# Patient Record
Sex: FEMALE | Race: OTHER | HISPANIC OR LATINO | URBAN - METROPOLITAN AREA
[De-identification: names, ages, dates, MRNs, and addresses within clinical notes are randomized per-mention and may not be internally consistent; named-entity substitution may affect disease eponyms.]

---

## 2017-07-01 ENCOUNTER — OUTPATIENT (OUTPATIENT)
Dept: OUTPATIENT SERVICES | Facility: HOSPITAL | Age: 47
LOS: 1 days | Discharge: HOME | End: 2017-07-01

## 2017-07-01 DIAGNOSIS — Z12.31 ENCOUNTER FOR SCREENING MAMMOGRAM FOR MALIGNANT NEOPLASM OF BREAST: ICD-10-CM

## 2017-10-25 ENCOUNTER — OUTPATIENT (OUTPATIENT)
Dept: OUTPATIENT SERVICES | Facility: HOSPITAL | Age: 47
LOS: 1 days | Discharge: HOME | End: 2017-10-25

## 2017-10-25 DIAGNOSIS — E03.9 HYPOTHYROIDISM, UNSPECIFIED: ICD-10-CM

## 2017-10-25 DIAGNOSIS — E79.9 DISORDER OF PURINE AND PYRIMIDINE METABOLISM, UNSPECIFIED: ICD-10-CM

## 2018-02-06 ENCOUNTER — OUTPATIENT (OUTPATIENT)
Dept: OUTPATIENT SERVICES | Facility: HOSPITAL | Age: 48
LOS: 1 days | Discharge: HOME | End: 2018-02-06

## 2018-02-06 DIAGNOSIS — E03.9 HYPOTHYROIDISM, UNSPECIFIED: ICD-10-CM

## 2018-02-06 DIAGNOSIS — E11.9 TYPE 2 DIABETES MELLITUS WITHOUT COMPLICATIONS: ICD-10-CM

## 2018-05-16 ENCOUNTER — OUTPATIENT (OUTPATIENT)
Dept: OUTPATIENT SERVICES | Facility: HOSPITAL | Age: 48
LOS: 1 days | Discharge: HOME | End: 2018-05-16

## 2018-05-16 DIAGNOSIS — E11.9 TYPE 2 DIABETES MELLITUS WITHOUT COMPLICATIONS: ICD-10-CM

## 2018-05-16 DIAGNOSIS — E88.9 METABOLIC DISORDER, UNSPECIFIED: ICD-10-CM

## 2018-05-16 DIAGNOSIS — E03.9 HYPOTHYROIDISM, UNSPECIFIED: ICD-10-CM

## 2018-06-28 ENCOUNTER — APPOINTMENT (OUTPATIENT)
Dept: OBGYN | Facility: CLINIC | Age: 48
End: 2018-06-28
Payer: COMMERCIAL

## 2018-06-28 PROBLEM — Z00.00 ENCOUNTER FOR PREVENTIVE HEALTH EXAMINATION: Status: ACTIVE | Noted: 2018-06-28

## 2018-06-28 PROCEDURE — 99396 PREV VISIT EST AGE 40-64: CPT

## 2018-08-04 ENCOUNTER — OUTPATIENT (OUTPATIENT)
Dept: OUTPATIENT SERVICES | Facility: HOSPITAL | Age: 48
LOS: 1 days | Discharge: HOME | End: 2018-08-04

## 2018-08-04 DIAGNOSIS — Z12.31 ENCOUNTER FOR SCREENING MAMMOGRAM FOR MALIGNANT NEOPLASM OF BREAST: ICD-10-CM

## 2018-10-11 ENCOUNTER — OUTPATIENT (OUTPATIENT)
Dept: OUTPATIENT SERVICES | Facility: HOSPITAL | Age: 48
LOS: 1 days | Discharge: HOME | End: 2018-10-11

## 2018-10-11 DIAGNOSIS — E03.9 HYPOTHYROIDISM, UNSPECIFIED: ICD-10-CM

## 2018-10-11 DIAGNOSIS — E78.00 PURE HYPERCHOLESTEROLEMIA, UNSPECIFIED: ICD-10-CM

## 2019-01-17 ENCOUNTER — APPOINTMENT (OUTPATIENT)
Dept: OBGYN | Facility: CLINIC | Age: 49
End: 2019-01-17
Payer: COMMERCIAL

## 2019-01-17 PROCEDURE — 87490 CHLMYD TRACH DNA DIR PROBE: CPT

## 2019-01-17 PROCEDURE — 99214 OFFICE O/P EST MOD 30 MIN: CPT

## 2019-08-17 ENCOUNTER — OUTPATIENT (OUTPATIENT)
Dept: OUTPATIENT SERVICES | Facility: HOSPITAL | Age: 49
LOS: 1 days | Discharge: HOME | End: 2019-08-17
Payer: COMMERCIAL

## 2019-08-17 DIAGNOSIS — Z12.31 ENCOUNTER FOR SCREENING MAMMOGRAM FOR MALIGNANT NEOPLASM OF BREAST: ICD-10-CM

## 2019-08-17 PROCEDURE — 77063 BREAST TOMOSYNTHESIS BI: CPT | Mod: 26

## 2019-08-17 PROCEDURE — 77067 SCR MAMMO BI INCL CAD: CPT | Mod: 26

## 2020-01-30 ENCOUNTER — APPOINTMENT (OUTPATIENT)
Dept: OBGYN | Facility: CLINIC | Age: 50
End: 2020-01-30
Payer: COMMERCIAL

## 2020-01-30 PROCEDURE — 99396 PREV VISIT EST AGE 40-64: CPT

## 2020-10-03 ENCOUNTER — RESULT REVIEW (OUTPATIENT)
Age: 50
End: 2020-10-03

## 2020-10-03 ENCOUNTER — OUTPATIENT (OUTPATIENT)
Dept: OUTPATIENT SERVICES | Facility: HOSPITAL | Age: 50
LOS: 1 days | Discharge: HOME | End: 2020-10-03
Payer: COMMERCIAL

## 2020-10-03 DIAGNOSIS — Z12.31 ENCOUNTER FOR SCREENING MAMMOGRAM FOR MALIGNANT NEOPLASM OF BREAST: ICD-10-CM

## 2020-10-03 PROCEDURE — 77063 BREAST TOMOSYNTHESIS BI: CPT | Mod: 26

## 2020-10-03 PROCEDURE — 77067 SCR MAMMO BI INCL CAD: CPT | Mod: 26

## 2021-05-06 ENCOUNTER — APPOINTMENT (OUTPATIENT)
Dept: HEMATOLOGY ONCOLOGY | Facility: CLINIC | Age: 51
End: 2021-05-06
Payer: COMMERCIAL

## 2021-05-06 ENCOUNTER — TRANSCRIPTION ENCOUNTER (OUTPATIENT)
Age: 51
End: 2021-05-06

## 2021-05-06 VITALS
SYSTOLIC BLOOD PRESSURE: 117 MMHG | TEMPERATURE: 98 F | HEART RATE: 96 BPM | RESPIRATION RATE: 20 BRPM | DIASTOLIC BLOOD PRESSURE: 73 MMHG | HEIGHT: 63 IN | WEIGHT: 180 LBS | BODY MASS INDEX: 31.89 KG/M2

## 2021-05-06 DIAGNOSIS — E78.00 PURE HYPERCHOLESTEROLEMIA, UNSPECIFIED: ICD-10-CM

## 2021-05-06 DIAGNOSIS — F17.200 NICOTINE DEPENDENCE, UNSPECIFIED, UNCOMPLICATED: ICD-10-CM

## 2021-05-06 DIAGNOSIS — I51.9 HEART DISEASE, UNSPECIFIED: ICD-10-CM

## 2021-05-06 DIAGNOSIS — E11.9 TYPE 2 DIABETES MELLITUS W/OUT COMPLICATIONS: ICD-10-CM

## 2021-05-06 DIAGNOSIS — E03.9 HYPOTHYROIDISM, UNSPECIFIED: ICD-10-CM

## 2021-05-06 LAB
BASOPHILS # BLD AUTO: 0.06 K/UL — SIGNIFICANT CHANGE UP (ref 0–0.2)
BASOPHILS NFR BLD AUTO: 0.5 % — SIGNIFICANT CHANGE UP (ref 0–1)
EOSINOPHIL # BLD AUTO: 0.31 K/UL — SIGNIFICANT CHANGE UP (ref 0–0.7)
EOSINOPHIL NFR BLD AUTO: 2.6 % — SIGNIFICANT CHANGE UP (ref 0–8)
HCT VFR BLD CALC: 38.1 % — SIGNIFICANT CHANGE UP (ref 37–47)
HGB BLD-MCNC: 13.1 G/DL — SIGNIFICANT CHANGE UP (ref 12–16)
IMM GRANULOCYTES NFR BLD AUTO: 0.7 % — HIGH (ref 0.1–0.3)
LYMPHOCYTES # BLD AUTO: 38.2 % — SIGNIFICANT CHANGE UP (ref 20.5–51.1)
LYMPHOCYTES # BLD AUTO: 4.53 K/UL — HIGH (ref 1.2–3.4)
MCHC RBC-ENTMCNC: 32 PG — HIGH (ref 27–31)
MCHC RBC-ENTMCNC: 34.4 G/DL — SIGNIFICANT CHANGE UP (ref 32–37)
MCV RBC AUTO: 92.9 FL — SIGNIFICANT CHANGE UP (ref 81–99)
MONOCYTES # BLD AUTO: 0.67 K/UL — HIGH (ref 0.1–0.6)
MONOCYTES NFR BLD AUTO: 5.7 % — SIGNIFICANT CHANGE UP (ref 1.7–9.3)
NEUTROPHILS # BLD AUTO: 6.2 K/UL — SIGNIFICANT CHANGE UP (ref 1.4–6.5)
NEUTROPHILS NFR BLD AUTO: 52.3 % — SIGNIFICANT CHANGE UP (ref 42.2–75.2)
NRBC # BLD: 0 /100 WBCS — SIGNIFICANT CHANGE UP (ref 0–0)
PLATELET # BLD AUTO: 323 K/UL — SIGNIFICANT CHANGE UP (ref 130–400)
RBC # BLD: 4.1 M/UL — LOW (ref 4.2–5.4)
RBC # FLD: 11.8 % — SIGNIFICANT CHANGE UP (ref 11.5–14.5)
WBC # BLD: 11.85 K/UL — HIGH (ref 4.8–10.8)
WBC # FLD AUTO: 11.85 K/UL — HIGH (ref 4.8–10.8)

## 2021-05-06 PROCEDURE — 88189 FLOWCYTOMETRY/READ 16 & >: CPT

## 2021-05-06 PROCEDURE — 99204 OFFICE O/P NEW MOD 45 MIN: CPT

## 2021-05-07 LAB
ALBUMIN SERPL ELPH-MCNC: 4.4 G/DL
ALP BLD-CCNC: 115 U/L
ALT SERPL-CCNC: 22 U/L
ANION GAP SERPL CALC-SCNC: 14 MMOL/L
AST SERPL-CCNC: 20 U/L
BILIRUB SERPL-MCNC: <0.2 MG/DL
BUN SERPL-MCNC: 16 MG/DL
CALCIUM SERPL-MCNC: 9.7 MG/DL
CHLORIDE SERPL-SCNC: 103 MMOL/L
CO2 SERPL-SCNC: 23 MMOL/L
CREAT SERPL-MCNC: 0.6 MG/DL
DIRECT COOMBS: NORMAL
GLUCOSE SERPL-MCNC: 147 MG/DL
IGG SER QL IEP: 1196 MG/DL
LDH SERPL-CCNC: 217
POTASSIUM SERPL-SCNC: 4.3 MMOL/L
PROT SERPL-MCNC: 6.9 G/DL
SODIUM SERPL-SCNC: 140 MMOL/L

## 2021-05-08 NOTE — HISTORY OF PRESENT ILLNESS
[de-identified] : 51 year old female referred to us by Dr Calderon for elevated WBC. She has PMH including diabetes, hypertension, and hypothyroidism. She is a currently every day smoker. 1PPD for the past 30 years. She denies any fever, weight loss,or new pain areas. She states that she does have night sweats since after being diagnosed with her hypothyroidism.  Reviewed CBC from March 2021 which showed CBC of 12.1 Hgb 13.1 Hct 39 MCV 95 Plt 331 with differential including lymphocytes of 5363 and neutrophils 5863. \par \par Sept 2020 WBC 11 lymphs 4400 \par \par She is up to date with GYN exam and annual mammograms. She has never been for an colonoscopy or surveillance CXR.

## 2021-05-08 NOTE — ASSESSMENT
[FreeTextEntry1] : 51 year old female with mild leukocytosis ( lymphocytosis ? ) , asymptomatic , no adenopathy or splenomegaly .\par Plan : routine blood work , Flow , SPEP , LDH , Co[place]'s.\par           if above negative may require additional work up including BCR/ABL ...\par           Patient was reassured , all questions addressed.

## 2021-05-10 PROBLEM — E78.00 HYPERCHOLESTEREMIA: Status: ACTIVE | Noted: 2021-05-06

## 2021-05-10 PROBLEM — E03.9 HYPOTHYROIDISM: Status: ACTIVE | Noted: 2021-05-06

## 2021-05-10 LAB
ALBUMIN MFR SERPL ELPH: 59.3 %
ALBUMIN SERPL-MCNC: 4.3 G/DL
ALBUMIN/GLOB SERPL: 1.4 RATIO
ALPHA1 GLOB MFR SERPL ELPH: 4.2 %
ALPHA1 GLOB SERPL ELPH-MCNC: 0.3 G/DL
ALPHA2 GLOB MFR SERPL ELPH: 10.4 %
ALPHA2 GLOB SERPL ELPH-MCNC: 0.8 G/DL
B-GLOBULIN MFR SERPL ELPH: 11.8 %
B-GLOBULIN SERPL ELPH-MCNC: 0.9 G/DL
GAMMA GLOB FLD ELPH-MCNC: 1 G/DL
GAMMA GLOB MFR SERPL ELPH: 14.3 %
INTERPRETATION SERPL IEP-IMP: NORMAL
PROT SERPL-MCNC: 7.3 G/DL
PROT SERPL-MCNC: 7.3 G/DL

## 2021-06-04 ENCOUNTER — NON-APPOINTMENT (OUTPATIENT)
Age: 51
End: 2021-06-04

## 2021-06-08 ENCOUNTER — APPOINTMENT (OUTPATIENT)
Dept: OBGYN | Facility: CLINIC | Age: 51
End: 2021-06-08
Payer: COMMERCIAL

## 2021-06-08 PROCEDURE — 99396 PREV VISIT EST AGE 40-64: CPT

## 2021-06-08 PROCEDURE — 99072 ADDL SUPL MATRL&STAF TM PHE: CPT

## 2021-06-22 ENCOUNTER — APPOINTMENT (OUTPATIENT)
Dept: OBGYN | Facility: CLINIC | Age: 51
End: 2021-06-22
Payer: COMMERCIAL

## 2021-06-22 PROCEDURE — 99072 ADDL SUPL MATRL&STAF TM PHE: CPT

## 2021-06-22 PROCEDURE — 76830 TRANSVAGINAL US NON-OB: CPT

## 2021-08-19 ENCOUNTER — OUTPATIENT (OUTPATIENT)
Dept: OUTPATIENT SERVICES | Facility: HOSPITAL | Age: 51
LOS: 1 days | Discharge: HOME | End: 2021-08-19
Payer: COMMERCIAL

## 2021-08-19 ENCOUNTER — APPOINTMENT (OUTPATIENT)
Dept: HEMATOLOGY ONCOLOGY | Facility: CLINIC | Age: 51
End: 2021-08-19
Payer: COMMERCIAL

## 2021-08-19 ENCOUNTER — LABORATORY RESULT (OUTPATIENT)
Age: 51
End: 2021-08-19

## 2021-08-19 VITALS
HEIGHT: 63 IN | BODY MASS INDEX: 32.96 KG/M2 | HEART RATE: 75 BPM | TEMPERATURE: 98.7 F | WEIGHT: 186 LBS | DIASTOLIC BLOOD PRESSURE: 84 MMHG | SYSTOLIC BLOOD PRESSURE: 110 MMHG | RESPIRATION RATE: 18 BRPM

## 2021-08-19 DIAGNOSIS — E03.9 HYPOTHYROIDISM, UNSPECIFIED: ICD-10-CM

## 2021-08-19 DIAGNOSIS — D72.820 LYMPHOCYTOSIS (SYMPTOMATIC): ICD-10-CM

## 2021-08-19 DIAGNOSIS — E78.00 PURE HYPERCHOLESTEROLEMIA, UNSPECIFIED: ICD-10-CM

## 2021-08-19 PROCEDURE — 99213 OFFICE O/P EST LOW 20 MIN: CPT

## 2021-08-20 LAB
HBV CORE IGG+IGM SER QL: REACTIVE
HBV SURFACE AB SER QL: REACTIVE
HBV SURFACE AG SER QL: NONREACTIVE
HCT VFR BLD CALC: 39.3 %
HCV AB SER QL: NONREACTIVE
HCV S/CO RATIO: 0.09 S/CO
HGB BLD-MCNC: 13.4 G/DL
MCHC RBC-ENTMCNC: 32.1 PG
MCHC RBC-ENTMCNC: 34.1 G/DL
MCV RBC AUTO: 94.2 FL
PLATELET # BLD AUTO: 294 K/UL
PMV BLD: 10.2 FL
RBC # BLD: 4.17 M/UL
RBC # FLD: 11.9 %
WBC # FLD AUTO: 10.86 K/UL

## 2021-08-20 NOTE — HISTORY OF PRESENT ILLNESS
[de-identified] : \par 51 year old female referred to us by Dr Calderon for elevated WBC. She has PMH including diabetes, hypertension, and hypothyroidism. She is a currently every day smoker. 1PPD for the past 30 years. She denies any fever, weight loss,or new pain areas. She states that she does have night sweats since after being diagnosed with her hypothyroidism. Reviewed CBC from March 2021 which showed CBC of 12.1 Hgb 13.1 Hct 39 MCV 95 Plt 331 with differential including lymphocytes of 5363 and neutrophils 5863. \par \par Sept 2020 WBC 11 lymphs 4400 \par \par She is up to date with GYN exam and annual mammograms. She has never been for an colonoscopy or surveillance CXR. \par  [de-identified] : 08/19/2021 Patient returns for follow up , work up for mild lymphocytosis was negative including flow cytometry . She had in the past tested positive for hepatitis c antibody in the past while trying to donate blood . she denies any history of hepatitis or treatment for it .

## 2021-08-20 NOTE — ASSESSMENT
[FreeTextEntry1] : 51 year old female with mild leukocytosis ( lymphocytosis ? ) , asymptomatic , no adenopathy or splenomegaly .\par negative work up including flow , today's CBC is essentially unchanged with borderline elevated lymphocyte count . \par history of positive hepatitis  serology , normal LFTs.\par Plan : check hepatitis serology and viral load. \par \par P.S tests show she is positive for hepatitis B surface and core antibodies \par discussed results with patient , would consider to check Hep B DNA despite normal LFTs . \par It is also plausible  lymphocytosis is related to her hypothyroidism ( Hashimoto's ) , regardless , she is reassured and no further work  up is recommended.

## 2021-08-25 DIAGNOSIS — D72.820 LYMPHOCYTOSIS (SYMPTOMATIC): ICD-10-CM

## 2021-11-13 ENCOUNTER — OUTPATIENT (OUTPATIENT)
Dept: OUTPATIENT SERVICES | Facility: HOSPITAL | Age: 51
LOS: 1 days | Discharge: HOME | End: 2021-11-13
Payer: COMMERCIAL

## 2021-11-13 ENCOUNTER — RESULT REVIEW (OUTPATIENT)
Age: 51
End: 2021-11-13

## 2021-11-13 DIAGNOSIS — Z12.31 ENCOUNTER FOR SCREENING MAMMOGRAM FOR MALIGNANT NEOPLASM OF BREAST: ICD-10-CM

## 2021-11-13 PROCEDURE — 77067 SCR MAMMO BI INCL CAD: CPT | Mod: 26

## 2021-11-13 PROCEDURE — 77063 BREAST TOMOSYNTHESIS BI: CPT | Mod: 26

## 2022-06-13 ENCOUNTER — APPOINTMENT (OUTPATIENT)
Dept: OBGYN | Facility: CLINIC | Age: 52
End: 2022-06-13
Payer: COMMERCIAL

## 2022-06-13 PROCEDURE — 99396 PREV VISIT EST AGE 40-64: CPT

## 2022-11-11 ENCOUNTER — RESULT REVIEW (OUTPATIENT)
Age: 52
End: 2022-11-11

## 2022-11-11 ENCOUNTER — OUTPATIENT (OUTPATIENT)
Dept: OUTPATIENT SERVICES | Facility: HOSPITAL | Age: 52
LOS: 1 days | Discharge: HOME | End: 2022-11-11

## 2022-11-11 DIAGNOSIS — Z12.31 ENCOUNTER FOR SCREENING MAMMOGRAM FOR MALIGNANT NEOPLASM OF BREAST: ICD-10-CM

## 2022-11-11 PROCEDURE — 77067 SCR MAMMO BI INCL CAD: CPT | Mod: 26

## 2022-11-11 PROCEDURE — 77063 BREAST TOMOSYNTHESIS BI: CPT | Mod: 26

## 2023-06-03 ENCOUNTER — NON-APPOINTMENT (OUTPATIENT)
Age: 53
End: 2023-06-03

## 2023-06-19 ENCOUNTER — APPOINTMENT (OUTPATIENT)
Dept: OBGYN | Facility: CLINIC | Age: 53
End: 2023-06-19
Payer: COMMERCIAL

## 2023-06-19 PROCEDURE — 99396 PREV VISIT EST AGE 40-64: CPT

## 2023-06-19 NOTE — COUNSELING
[Nutrition/ Exercise/ Weight Management] : nutrition, exercise, weight management [Vitamins/Supplements] : vitamins/supplements [Sunscreen] : sunscreen [Smoking Cessation] : smoking cessation [Breast Self Exam] : breast self exam

## 2023-06-19 NOTE — HISTORY OF PRESENT ILLNESS
[FreeTextEntry1] : ----54 Y/O  HERE FOR  CHECK UP;.MENOPAUSAL.\par PMHX;/\par PSHX;GB\par SOCIAL;-ETOH -[employer]  1PPD      WE DISCUSSED SMOKING CESSATION;RBA DISCUSSED;RX OPTIONS DISCUSSED AND PT'S DESIRE TO STOP SMOKING.\par STD;    /          DISCUSSED CONDOMS\par FAMILY HX OF BREAST CANCER;NO\par REVIEW OF SYMPTOMS DONE\par ALLERGIES;  Patient has answered NKDA\par Medication reconciliation was completed by reviewing, with the patient's\par involvement, the patient's current outpatient medications and those \par ordered for the patient today. \par \par PE; BREASTS;- MASSES DC NODES SBE\par ABD SOFT NT ND\par NL GENIT \par VAGINA -DC\par CX -CMT\par UTERUS NL SIZE NT\par ADNEXA NT -MASSES\par \par A;P;DOING WELL\par -PAP\par -SAAD UP TO DATE\par -F-U PRN.

## 2023-06-22 LAB
CYTOLOGY CVX/VAG DOC THIN PREP: NORMAL
HPV HIGH+LOW RISK DNA PNL CVX: NOT DETECTED

## 2023-12-28 ENCOUNTER — OUTPATIENT (OUTPATIENT)
Dept: OUTPATIENT SERVICES | Facility: HOSPITAL | Age: 53
LOS: 1 days | End: 2023-12-28
Payer: COMMERCIAL

## 2023-12-28 ENCOUNTER — RESULT REVIEW (OUTPATIENT)
Age: 53
End: 2023-12-28

## 2023-12-28 DIAGNOSIS — Z12.31 ENCOUNTER FOR SCREENING MAMMOGRAM FOR MALIGNANT NEOPLASM OF BREAST: ICD-10-CM

## 2023-12-28 PROCEDURE — 77063 BREAST TOMOSYNTHESIS BI: CPT

## 2023-12-28 PROCEDURE — 77063 BREAST TOMOSYNTHESIS BI: CPT | Mod: 26

## 2023-12-28 PROCEDURE — 77067 SCR MAMMO BI INCL CAD: CPT

## 2023-12-28 PROCEDURE — 77067 SCR MAMMO BI INCL CAD: CPT | Mod: 26

## 2023-12-29 DIAGNOSIS — Z12.31 ENCOUNTER FOR SCREENING MAMMOGRAM FOR MALIGNANT NEOPLASM OF BREAST: ICD-10-CM

## 2024-01-14 ENCOUNTER — NON-APPOINTMENT (OUTPATIENT)
Age: 54
End: 2024-01-14

## 2024-07-02 ENCOUNTER — APPOINTMENT (OUTPATIENT)
Dept: OBGYN | Facility: CLINIC | Age: 54
End: 2024-07-02

## 2024-07-02 DIAGNOSIS — Z01.419 ENCOUNTER FOR GYNECOLOGICAL EXAMINATION (GENERAL) (ROUTINE) W/OUT ABNORMAL FINDINGS: ICD-10-CM

## 2024-07-02 DIAGNOSIS — N76.0 ACUTE VAGINITIS: ICD-10-CM

## 2024-07-02 PROCEDURE — 99396 PREV VISIT EST AGE 40-64: CPT

## 2024-07-02 PROCEDURE — 99213 OFFICE O/P EST LOW 20 MIN: CPT | Mod: 25

## 2024-07-02 RX ORDER — METRONIDAZOLE 500 MG/1
500 TABLET ORAL
Qty: 14 | Refills: 0 | Status: ACTIVE | COMMUNITY
Start: 2024-07-02 | End: 1900-01-01

## 2024-07-03 LAB
BV BACTERIA RRNA VAG QL NAA+PROBE: DETECTED
C GLABRATA RNA VAG QL NAA+PROBE: NOT DETECTED
CANDIDA RRNA VAG QL PROBE: DETECTED
T VAGINALIS RRNA SPEC QL NAA+PROBE: NOT DETECTED

## 2024-07-03 RX ORDER — FLUCONAZOLE 150 MG/1
150 TABLET ORAL
Qty: 1 | Refills: 1 | Status: ACTIVE | COMMUNITY
Start: 2024-07-03 | End: 1900-01-01

## 2024-07-04 ENCOUNTER — NON-APPOINTMENT (OUTPATIENT)
Age: 54
End: 2024-07-04

## 2024-07-08 LAB
CYTOLOGY CVX/VAG DOC THIN PREP: NORMAL
HPV HIGH+LOW RISK DNA PNL CVX: NOT DETECTED

## 2024-08-02 ENCOUNTER — OUTPATIENT (OUTPATIENT)
Dept: OUTPATIENT SERVICES | Facility: HOSPITAL | Age: 54
LOS: 1 days | End: 2024-08-02
Payer: COMMERCIAL

## 2024-08-02 DIAGNOSIS — I25.10 ATHEROSCLEROTIC HEART DISEASE OF NATIVE CORONARY ARTERY WITHOUT ANGINA PECTORIS: ICD-10-CM

## 2024-08-02 DIAGNOSIS — Z00.8 ENCOUNTER FOR OTHER GENERAL EXAMINATION: ICD-10-CM

## 2024-08-02 PROCEDURE — 75574 CT ANGIO HRT W/3D IMAGE: CPT

## 2024-08-02 PROCEDURE — 75574 CT ANGIO HRT W/3D IMAGE: CPT | Mod: 26

## 2024-08-03 DIAGNOSIS — I25.10 ATHEROSCLEROTIC HEART DISEASE OF NATIVE CORONARY ARTERY WITHOUT ANGINA PECTORIS: ICD-10-CM

## 2024-12-26 ENCOUNTER — RESULT REVIEW (OUTPATIENT)
Age: 54
End: 2024-12-26

## 2024-12-26 ENCOUNTER — OUTPATIENT (OUTPATIENT)
Dept: OUTPATIENT SERVICES | Facility: HOSPITAL | Age: 54
LOS: 1 days | End: 2024-12-26
Payer: COMMERCIAL

## 2024-12-26 DIAGNOSIS — Z12.31 ENCOUNTER FOR SCREENING MAMMOGRAM FOR MALIGNANT NEOPLASM OF BREAST: ICD-10-CM

## 2024-12-26 PROCEDURE — 77067 SCR MAMMO BI INCL CAD: CPT

## 2024-12-26 PROCEDURE — 77063 BREAST TOMOSYNTHESIS BI: CPT

## 2024-12-26 PROCEDURE — 77063 BREAST TOMOSYNTHESIS BI: CPT | Mod: 26

## 2024-12-26 PROCEDURE — 77067 SCR MAMMO BI INCL CAD: CPT | Mod: 26

## 2024-12-27 DIAGNOSIS — Z12.31 ENCOUNTER FOR SCREENING MAMMOGRAM FOR MALIGNANT NEOPLASM OF BREAST: ICD-10-CM

## 2025-08-18 ENCOUNTER — APPOINTMENT (OUTPATIENT)
Dept: OBGYN | Facility: CLINIC | Age: 55
End: 2025-08-18

## 2025-09-02 ENCOUNTER — APPOINTMENT (OUTPATIENT)
Dept: OBGYN | Facility: CLINIC | Age: 55
End: 2025-09-02
Payer: COMMERCIAL

## 2025-09-02 DIAGNOSIS — N76.0 ACUTE VAGINITIS: ICD-10-CM

## 2025-09-02 PROCEDURE — 99214 OFFICE O/P EST MOD 30 MIN: CPT

## 2025-09-02 PROCEDURE — 99459 PELVIC EXAMINATION: CPT

## 2025-09-02 RX ORDER — TERCONAZOLE 8 MG/G
0.8 CREAM VAGINAL
Qty: 20 | Refills: 2 | Status: ACTIVE | COMMUNITY
Start: 2025-09-02 | End: 1900-01-01

## 2025-09-08 LAB
BV BACTERIA RRNA VAG QL NAA+PROBE: DETECTED
C GLABRATA RNA VAG QL NAA+PROBE: NOT DETECTED
C TRACH RRNA SPEC QL NAA+PROBE: NOT DETECTED
CANDIDA RRNA VAG QL PROBE: DETECTED
N GONORRHOEA RRNA SPEC QL NAA+PROBE: NOT DETECTED
T VAGINALIS RRNA SPEC QL NAA+PROBE: NOT DETECTED

## 2025-09-08 RX ORDER — METRONIDAZOLE 500 MG/1
500 TABLET ORAL
Qty: 14 | Refills: 0 | Status: ACTIVE | COMMUNITY
Start: 2025-09-08 | End: 1900-01-01

## 2025-09-11 ENCOUNTER — NON-APPOINTMENT (OUTPATIENT)
Age: 55
End: 2025-09-11